# Patient Record
Sex: FEMALE | Race: OTHER | Employment: UNEMPLOYED | URBAN - METROPOLITAN AREA
[De-identification: names, ages, dates, MRNs, and addresses within clinical notes are randomized per-mention and may not be internally consistent; named-entity substitution may affect disease eponyms.]

---

## 2018-07-31 ENCOUNTER — HOSPITAL ENCOUNTER (EMERGENCY)
Age: 58
Discharge: HOME | End: 2018-07-31
Attending: EMERGENCY MEDICINE
Payer: SELF-PAY

## 2018-07-31 VITALS
OXYGEN SATURATION: 97 % | WEIGHT: 180 LBS | BODY MASS INDEX: 35.34 KG/M2 | DIASTOLIC BLOOD PRESSURE: 83 MMHG | TEMPERATURE: 98.4 F | HEIGHT: 60 IN | SYSTOLIC BLOOD PRESSURE: 183 MMHG | RESPIRATION RATE: 18 BRPM | HEART RATE: 60 BPM

## 2018-07-31 DIAGNOSIS — R51.9 ACUTE NONINTRACTABLE HEADACHE, UNSPECIFIED HEADACHE TYPE: Primary | ICD-10-CM

## 2018-07-31 PROCEDURE — 74011250636 HC RX REV CODE- 250/636: Performed by: EMERGENCY MEDICINE

## 2018-07-31 PROCEDURE — 96375 TX/PRO/DX INJ NEW DRUG ADDON: CPT

## 2018-07-31 PROCEDURE — 99281 EMR DPT VST MAYX REQ PHY/QHP: CPT

## 2018-07-31 PROCEDURE — 96374 THER/PROPH/DIAG INJ IV PUSH: CPT

## 2018-07-31 RX ORDER — PROCHLORPERAZINE EDISYLATE 5 MG/ML
10 INJECTION INTRAMUSCULAR; INTRAVENOUS
Status: COMPLETED | OUTPATIENT
Start: 2018-07-31 | End: 2018-07-31

## 2018-07-31 RX ORDER — DIPHENHYDRAMINE HYDROCHLORIDE 50 MG/ML
25 INJECTION, SOLUTION INTRAMUSCULAR; INTRAVENOUS ONCE
Status: COMPLETED | OUTPATIENT
Start: 2018-07-31 | End: 2018-07-31

## 2018-07-31 RX ORDER — SODIUM CHLORIDE 0.9 % (FLUSH) 0.9 %
5-10 SYRINGE (ML) INJECTION AS NEEDED
Status: DISCONTINUED | OUTPATIENT
Start: 2018-07-31 | End: 2018-07-31 | Stop reason: HOSPADM

## 2018-07-31 RX ORDER — SODIUM CHLORIDE 0.9 % (FLUSH) 0.9 %
5-10 SYRINGE (ML) INJECTION EVERY 8 HOURS
Status: DISCONTINUED | OUTPATIENT
Start: 2018-07-31 | End: 2018-07-31 | Stop reason: HOSPADM

## 2018-07-31 RX ORDER — KETOROLAC TROMETHAMINE 30 MG/ML
30 INJECTION, SOLUTION INTRAMUSCULAR; INTRAVENOUS
Status: COMPLETED | OUTPATIENT
Start: 2018-07-31 | End: 2018-07-31

## 2018-07-31 RX ADMIN — KETOROLAC TROMETHAMINE 30 MG: 30 INJECTION, SOLUTION INTRAMUSCULAR at 16:34

## 2018-07-31 RX ADMIN — PROCHLORPERAZINE EDISYLATE 10 MG: 5 INJECTION INTRAMUSCULAR; INTRAVENOUS at 16:34

## 2018-07-31 RX ADMIN — DIPHENHYDRAMINE HYDROCHLORIDE 25 MG: 50 INJECTION, SOLUTION INTRAMUSCULAR; INTRAVENOUS at 16:34

## 2018-07-31 NOTE — ED PROVIDER NOTES
HPI Comments: 59 y.o. female with no significant past medical history who presents from home with chief complaint of headache. Pt's relative reports anterior and superior headache which is often behind the eyes for 1 day accompanied by photophobia. Relative notes pt has hx of headaches for many years, usually once per week. This HA is similar. Pt was seen at [facility] yesterday and was prescribed Fioricet w/ no relief. Pt denies nausea and vomiting. There are no other acute medical concerns at this time. PCP: None Note written by Magi Farris, as dictated by Belkis Quintero MD 3:49 PM 
 
The history is provided by the patient and a relative. A  was used. No past medical history on file. No past surgical history on file. No family history on file. Social History Social History  Marital status: SINGLE Spouse name: N/A  
 Number of children: N/A  
 Years of education: N/A Occupational History  Not on file. Social History Main Topics  Smoking status: Not on file  Smokeless tobacco: Not on file  Alcohol use Not on file  Drug use: Not on file  Sexual activity: Not on file Other Topics Concern  Not on file Social History Narrative ALLERGIES: Review of patient's allergies indicates no known allergies. Review of Systems Eyes: Positive for photophobia. Gastrointestinal: Negative for nausea and vomiting. Neurological: Positive for headaches. All other systems reviewed and are negative. Vitals:  
 07/31/18 1434 BP: 183/83 Pulse: 60 Resp: 18 Temp: 98.4 °F (36.9 °C) SpO2: 97% Weight: 81.6 kg (180 lb) Height: 5' (1.524 m) Physical Exam  
Constitutional: She is oriented to person, place, and time. She appears well-developed and well-nourished. No distress. HENT:  
Head: Normocephalic and atraumatic. Eyes: Conjunctivae are normal. No scleral icterus. Neck: Neck supple.  No tracheal deviation present. Cardiovascular: Normal rate, regular rhythm, normal heart sounds and intact distal pulses. Exam reveals no gallop and no friction rub. No murmur heard. Pulmonary/Chest: Effort normal and breath sounds normal. She has no wheezes. She has no rales. Abdominal: Soft. She exhibits no distension. There is no tenderness. There is no rebound and no guarding. Musculoskeletal: She exhibits no edema. Neurological: She is alert and oriented to person, place, and time. No cranial nerve deficit. Strength and sensation normal.    
Skin: Skin is warm and dry. No rash noted. Psychiatric: She has a normal mood and affect. Nursing note and vitals reviewed. [facility] 
 
 
ED Course Procedures Progress note: she feels moderately better after migraine cocktail. A/P: HA - unclear etiology; it appears she has had similar HA's for years; reassuring appearance and exam; VSS; feels a little better after cocktail; [facility]uro [facility].   Hernando Nugent MD

## 2018-07-31 NOTE — DISCHARGE INSTRUCTIONS
Dolor de caitlin: Instrucciones de cuidado - [ Headache: Care Instructions ]  Instrucciones de cuidado    Los babar de caitlin tienen muchas causas posibles. La mayoría de los babar de caitlin no son señal de un problema más shruthi y mejoran por sí solos. El tratamiento en el hogar podría ayudarlo a sentirse mejor con Tonia Alvarez. El médico lo guevara revisado minuciosamente, darline puede desarrollar problemas más tarde. Si nota algún problema o síntomas, busque tratamiento médico inmediatamente. La atención de seguimiento es tammy parte clave de crouch tratamiento y seguridad. Asegúrese de hacer y acudir a todas las citas, y llame a crouch médico si está teniendo problemas. También es tammy buena idea saber los resultados de anastasiya exámenes y mantener tammy lista de los medicamentos que flakito. ¿Cómo puede cuidarse en el [facility]? · No conduzca si ha tomado analgésicos (medicamentos para el dolor) recetados. · Descanse en un cuarto tranquilo y oscuro hasta que desaparezca el dolor de [place]. Cierre los ojos y trate de relajarse o dormirse. No ann la televisión ni jose. · Colóquese un paño frío y húmedo o [employer] compresa fría sobre la tony adolorida de 10 a 21 minutos cada vez. Póngase un paño galvan entre la compresa fría y la piel. · Utilice tammy toalla húmeda tibia o tammy almohadilla térmica ajustada a baja temperatura para relajar los músculos tensos del izzy y los hombros.  · [place] a alguien que le jody masajes suaves en el izzy y los hombros.  · [place] los analgésicos exactamente belle le fueron indicados. ¨ Si el médico le recetó un analgésico, tómelo según las indicaciones. ¨ Si no está tomando un analgésico recetado, pregúntele a crouch médico si puede anat marge de [employer]. · Tenga cuidado de no anat analgésicos con mayor frecuencia que la permitida en las indicaciones porque los babar de caitlin podrían empeorar o aparecer con mayor frecuencia tammy vez que el medicamento pierda crouch [place].   · Preste atención a los nuevos síntomas que aparecen con el dolor de Faye Phelps, [place], debilidad o entumecimiento, cambios en la visión o confusión. Podrían ser señales de un problema más grave. Para prevenir los babar de caitlin  · [employer] un diario de anastasiya babar de caitlin para que pueda averiguar qué los desencadena. Evitar los desencadenantes podría ayudar a prevenir los babar de Faye Phelps. Anote cuándo empieza cada dolor de Faye Phelps, cuánto dura y cómo es el dolor (palpitante, vanesa, punzante o sordo). Anote cualquier otro síntoma que haya tenido con el dolor de Faye Phelps, [place] náuseas, destellos de lisa o OLGA, o sensibilidad a la lisa brillante o a los ruidos juan. Anote si el dolor de caitlin ocurrió cerca de crouch menstruación. Enumere todos los factores que pudieran saturnino desencadenado el dolor de Faye Phelps, belle ciertos alimentos (chocolate, queso, vino) u olores, humo, luces brillantes, estrés o falta de sueño. · Encuentre maneras saludables de [facility]. Los babar de Faye Phelps son más comunes linda o kd después de un momento estresante. Tómese un tiempo para relajarse antes y después de hacer algo que le haya causado un dolor de caitlin en el pasado. · Trate de mantener anastasiya músculos relajados mediante tammy buena postura. Revise si tiene [employer] de la Yu, la yohana, el izzy y los hombros y trate de relajarlos. Cuando se siente en un escritorio, cambie de posición con frecuencia y estírese por 27 segundos cada hora. · Jeff suficiente ejercicio y duerma bastante. · Coma en forma regular y raúl. Largos períodos sin comer pueden provocar un dolor de caitlin. · Regálese un masaje. Algunas personas encuentran que los masajes hechos con regularidad son [employer] Capes para aliviar la tensión. · Limite la cafeína. No los demasiado café, té ni sodas. Yeyo no deje de consumir cafeína de repente, porque eso también puede provocarle babar de Faye Phelps.   · Reduzca la tensión en los ojos a causa de la computadora parpadeando con frecuencia y apartando la mirada de la pantalla a menudo. Asegúrese de tener lentes adecuados y de que crouch monitor esté colocado de manera correcta, belle a un brazo de distancia. · Busque ayuda si tiene depresión o ansiedad. Miracle babar de [place] podrían relacionarse con estas afecciones. El tratamiento puede prevenir los babar de [place] y ayudar con los síntomas de ansiedad o depresión. ¿Cuándo debe pedir ayuda? Llame al 911 en cualquier momento que piense que puede necesitar atención de emergencia. Por ejemplo, llame si:    · Tiene señales de un ataque cerebral. Estas pueden incluir:  ¨ Parálisis, entumecimiento o debilidad repentinos en la yohana, el brazo o la pierna, sobre todo si ocurre en un solo lado del cuerpo. ¨ Cambios repentinos en la visión. ¨ Dificultades repentinas para hablar. ¨ Confusión repentina o dificultad para comprender frases sencillas. ¨ Problemas repentinos para caminar o mantener el equilibrio. ¨ Dolor de [place] intenso y repentino, distinto de los babar de caitlin anteriores.    Llame a crouch médico ahora mismo o busque atención médica inmediata si:    · Tiene un dolor de Cristine Severs o peor.     · Crouch dolor de caitlin empeora mucho. ¿Dónde puede encontrar más información en inglés? Elaine Henderson a http://drake-biju.info/. Escriba X762 en la búsqueda para aprender más acerca de \"Dolor de caitlin: Instrucciones de cuidado - [ Headache: Care Instructions ]. \"  Revisado: 9 octubre, 2017  Versión del contenido: 11.7  © 5987-0006 [employer]. Las instrucciones de cuidado fueron adaptadas bajo licencia por [employer] (which disclaims liability or warranty for this information). Si usted tiene [place] afección médica o sobre estas instrucciones, siempre pregunte a crouch profesional de alysha. [employer] niega toda garantía o responsabilidad por crouch uso de esta información.

## 2018-07-31 NOTE — ED TRIAGE NOTES
Pt arrives with c/o of a headache that has been going on since yesterday, pt is sensitive to light as well. Pt denies any feelings of n/v. [facility] and was given medicine, pt states it is not helping.

## 2019-01-03 PROCEDURE — 99283 EMERGENCY DEPT VISIT LOW MDM: CPT

## 2019-01-03 PROCEDURE — 96374 THER/PROPH/DIAG INJ IV PUSH: CPT

## 2019-01-03 PROCEDURE — 96375 TX/PRO/DX INJ NEW DRUG ADDON: CPT

## 2019-01-04 ENCOUNTER — APPOINTMENT (OUTPATIENT)
Dept: GENERAL RADIOLOGY | Age: 59
End: 2019-01-04
Attending: PHYSICIAN ASSISTANT
Payer: SELF-PAY

## 2019-01-04 ENCOUNTER — HOSPITAL ENCOUNTER (EMERGENCY)
Age: 59
Discharge: HOME | End: 2019-01-04
Attending: EMERGENCY MEDICINE
Payer: SELF-PAY

## 2019-01-04 VITALS
TEMPERATURE: 98.2 F | SYSTOLIC BLOOD PRESSURE: 132 MMHG | HEIGHT: 59 IN | RESPIRATION RATE: 16 BRPM | WEIGHT: 170 LBS | OXYGEN SATURATION: 94 % | BODY MASS INDEX: 34.27 KG/M2 | HEART RATE: 69 BPM | DIASTOLIC BLOOD PRESSURE: 66 MMHG

## 2019-01-04 DIAGNOSIS — R51.9 NONINTRACTABLE EPISODIC HEADACHE, UNSPECIFIED HEADACHE TYPE: Primary | ICD-10-CM

## 2019-01-04 DIAGNOSIS — M54.32 SCIATICA, LEFT SIDE: ICD-10-CM

## 2019-01-04 DIAGNOSIS — M51.36 DDD (DEGENERATIVE DISC DISEASE), LUMBAR: ICD-10-CM

## 2019-01-04 LAB
APPEARANCE UR: CLEAR
BACTERIA URNS QL MICRO: NEGATIVE /HPF
BILIRUB UR QL: NEGATIVE
CAOX CRY URNS QL MICRO: ABNORMAL
COLOR UR: ABNORMAL
COMMENT, HOLDF: NORMAL
EPITH CASTS URNS QL MICRO: ABNORMAL /LPF
GLUCOSE BLD STRIP.AUTO-MCNC: 166 MG/DL (ref 65–100)
GLUCOSE UR STRIP.AUTO-MCNC: >1000 MG/DL
HGB UR QL STRIP: ABNORMAL
KETONES UR QL STRIP.AUTO: NEGATIVE MG/DL
LEUKOCYTE ESTERASE UR QL STRIP.AUTO: ABNORMAL
NITRITE UR QL STRIP.AUTO: NEGATIVE
PH UR STRIP: 5.5 [PH] (ref 5–8)
PROT UR STRIP-MCNC: NEGATIVE MG/DL
RBC #/AREA URNS HPF: ABNORMAL /HPF (ref 0–5)
SAMPLES BEING HELD,HOLD: NORMAL
SERVICE CMNT-IMP: ABNORMAL
SP GR UR REFRACTOMETRY: 1.02 (ref 1–1.03)
UR CULT HOLD, URHOLD: NORMAL
UROBILINOGEN UR QL STRIP.AUTO: 0.2 EU/DL (ref 0.2–1)
WBC URNS QL MICRO: ABNORMAL /HPF (ref 0–4)

## 2019-01-04 PROCEDURE — 74011250636 HC RX REV CODE- 250/636: Performed by: PHYSICIAN ASSISTANT

## 2019-01-04 PROCEDURE — 87147 CULTURE TYPE IMMUNOLOGIC: CPT

## 2019-01-04 PROCEDURE — 81001 URINALYSIS AUTO W/SCOPE: CPT

## 2019-01-04 PROCEDURE — 87086 URINE CULTURE/COLONY COUNT: CPT

## 2019-01-04 PROCEDURE — 82962 GLUCOSE BLOOD TEST: CPT

## 2019-01-04 PROCEDURE — 72100 X-RAY EXAM L-S SPINE 2/3 VWS: CPT

## 2019-01-04 PROCEDURE — 73502 X-RAY EXAM HIP UNI 2-3 VIEWS: CPT

## 2019-01-04 RX ORDER — IBUPROFEN 400 MG/1
400 TABLET ORAL
Qty: 20 TAB | Refills: 0 | Status: SHIPPED | OUTPATIENT
Start: 2019-01-04

## 2019-01-04 RX ORDER — PROCHLORPERAZINE EDISYLATE 5 MG/ML
10 INJECTION INTRAMUSCULAR; INTRAVENOUS
Status: COMPLETED | OUTPATIENT
Start: 2019-01-04 | End: 2019-01-04

## 2019-01-04 RX ORDER — KETOROLAC TROMETHAMINE 30 MG/ML
15 INJECTION, SOLUTION INTRAMUSCULAR; INTRAVENOUS
Status: COMPLETED | OUTPATIENT
Start: 2019-01-04 | End: 2019-01-04

## 2019-01-04 RX ORDER — KETOROLAC TROMETHAMINE 30 MG/ML
INJECTION, SOLUTION INTRAMUSCULAR; INTRAVENOUS
Status: DISCONTINUED
Start: 2019-01-04 | End: 2019-01-04 | Stop reason: HOSPADM

## 2019-01-04 RX ORDER — PROCHLORPERAZINE EDISYLATE 5 MG/ML
INJECTION INTRAMUSCULAR; INTRAVENOUS
Status: DISCONTINUED
Start: 2019-01-04 | End: 2019-01-04 | Stop reason: HOSPADM

## 2019-01-04 RX ORDER — BUTALBITAL, ACETAMINOPHEN AND CAFFEINE 300; 40; 50 MG/1; MG/1; MG/1
1-2 CAPSULE ORAL
Qty: 15 CAP | Refills: 0 | Status: SHIPPED | OUTPATIENT
Start: 2019-01-04

## 2019-01-04 RX ORDER — DIPHENHYDRAMINE HYDROCHLORIDE 50 MG/ML
INJECTION, SOLUTION INTRAMUSCULAR; INTRAVENOUS
Status: DISCONTINUED
Start: 2019-01-04 | End: 2019-01-04 | Stop reason: HOSPADM

## 2019-01-04 RX ORDER — DIPHENHYDRAMINE HYDROCHLORIDE 50 MG/ML
25 INJECTION, SOLUTION INTRAMUSCULAR; INTRAVENOUS
Status: COMPLETED | OUTPATIENT
Start: 2019-01-04 | End: 2019-01-04

## 2019-01-04 RX ADMIN — DIPHENHYDRAMINE HYDROCHLORIDE 25 MG: 50 INJECTION, SOLUTION INTRAMUSCULAR; INTRAVENOUS at 01:23

## 2019-01-04 RX ADMIN — KETOROLAC TROMETHAMINE 15 MG: 30 INJECTION, SOLUTION INTRAMUSCULAR at 01:24

## 2019-01-04 RX ADMIN — PROCHLORPERAZINE EDISYLATE 10 MG: 5 INJECTION INTRAMUSCULAR; INTRAVENOUS at 01:25

## 2019-01-04 NOTE — ED PROVIDER NOTES
Alpa Andre is a 59 y.o. female with PMH of headache presents to emergency room ambulatory with son who translates at patient's request, for evaluation of headache which came on at 2pm, slow onset with mild light sensitivity. Also c/o intermittent tingling from L buttock, L hip to left knee which began 2 weeks ago. No recent fall, F/C, neck stiffness/pain, N/V/D, CP, SOB, rash, tinnitus, dizziness, syncope / near syncope, urinary sx's. No change in gait or leg swelling. States LYNCH feels similar to her previous HA when she was seen in July 2018 at [facility] and given Benadryl, compazine, toradol with improvement of her symptoms. No bowel/bladder dysfunction, no saddle anesthesia, no extremity weakness. PCP: None Surgical hx- see chart Social hx- no tobacco 
 
The patient has no other complaints at this time. History reviewed. No pertinent past medical history. History reviewed. No pertinent surgical history. History reviewed. No pertinent family history. Social History Socioeconomic History  Marital status: SINGLE Spouse name: Not on file  Number of children: Not on file  Years of education: Not on file  Highest education level: Not on file Social Needs  Financial resource strain: Not on file  Food insecurity - worry: Not on file  Food insecurity - inability: Not on file  Transportation needs - medical: Not on file  Transportation needs - non-medical: Not on file Occupational History  Not on file Tobacco Use  Smoking status: Never Smoker  Smokeless tobacco: Never Used Substance and Sexual Activity  Alcohol use: No  
  Frequency: Never  Drug use: No  
 Sexual activity: Not on file Other Topics Concern  Not on file Social History Narrative  Not on file ALLERGIES: Patient has no known allergies. Review of Systems Constitutional: Negative.   Negative for activity change, chills, fatigue and unexpected weight change. HENT: Negative for trouble swallowing. Respiratory: Negative for cough, chest tightness, shortness of breath and wheezing. Cardiovascular: Negative. Negative for chest pain and palpitations. Gastrointestinal: Negative. Negative for abdominal pain, diarrhea, nausea and vomiting. Genitourinary: Negative. Negative for dysuria, flank pain, frequency and hematuria. Musculoskeletal: Positive for back pain (L lower). Negative for arthralgias, neck pain and neck stiffness. Skin: Negative. Negative for color change and rash. Neurological: Positive for headaches. Negative for dizziness and numbness. All other systems reviewed and are negative. Vitals:  
 01/03/19 2338 BP: 188/90 Pulse: 74 Resp: 18 Temp: 98.2 °F (36.8 °C) SpO2: 97% Weight: 77.1 kg (170 lb) Height: 4' 10.66\" (1.49 m) Physical Exam  
Constitutional: She is oriented to person, place, and time. She appears well-developed and well-nourished. She is active. Non-toxic appearance. No distress. HENT:  
Head: Normocephalic and atraumatic. Eyes: Conjunctivae are normal. Pupils are equal, round, and reactive to light. Right eye exhibits no discharge. Left eye exhibits no discharge. Neck: Normal range of motion and full passive range of motion without pain. No tracheal tenderness present. No meningeal signs Cardiovascular: Normal rate, regular rhythm, normal heart sounds, intact distal pulses and normal pulses. Exam reveals no gallop and no friction rub. No murmur heard. Pulmonary/Chest: Effort normal and breath sounds normal. No respiratory distress. She has no wheezes. She has no rales. She exhibits no tenderness. Abdominal: Soft. Bowel sounds are normal. She exhibits no distension. There is no tenderness. There is no rebound and no guarding. Musculoskeletal: Normal range of motion. She exhibits no edema or tenderness. Neurological: She is alert and oriented to person, place, and time. She has normal strength. No cranial nerve deficit or sensory deficit. Coordination normal.  
Strength 5/5 in upper and lower extremities. NVI throughout. Skin: Skin is warm, dry and intact. Capillary refill takes less than 2 seconds. No abrasion and no rash noted. She is not diaphoretic. No erythema. Psychiatric: She has a normal mood and affect. Her speech is normal and behavior is normal. Cognition and memory are normal.  
Nursing note and vitals reviewed. MDM Number of Diagnoses or Management Options Diagnosis management comments:  
Ddx: headache, migraine ; UTI, sciatica, frx, DJD Amount and/or Complexity of Data Reviewed Clinical lab tests: ordered and reviewed Tests in the radiology section of CPT®: ordered and reviewed Review and summarize past medical records: yes Discuss the patient with other providers: yes Patient Progress Patient progress: stable Procedures I discussed patient's PMH, exam findings as well as careplan with the ER attending who agrees with care plan. Laci Gann PA-C 
 
 
 
2:46 AM 
Patient re-assessed, HA improved, only minimal now. L buttock pain resolved. Discussed XR and lab findings. No sx's of cauda equina. Laci Gann PA-C 
 
LABORATORY TESTS: 
Recent Results (from the past 12 hour(s)) SAMPLES BEING HELD Collection Time: 01/04/19  1:05 AM  
Result Value Ref Range SAMPLES BEING HELD SST,RD,BUTCH,LAV,PST COMMENT Add-on orders for these samples will be processed based on acceptable specimen integrity and analyte stability, which may vary by analyte. URINALYSIS W/MICROSCOPIC Collection Time: 01/04/19  1:14 AM  
Result Value Ref Range Color YELLOW/STRAW Appearance CLEAR CLEAR Specific gravity 1.020 1.003 - 1.030    
 pH (UA) 5.5 5.0 - 8.0 Protein NEGATIVE  NEG mg/dL Glucose >1,000 (A) NEG mg/dL Ketone NEGATIVE  NEG mg/dL Bilirubin NEGATIVE  NEG Blood TRACE (A) NEG Urobilinogen 0.2 0.2 - 1.0 EU/dL Nitrites NEGATIVE  NEG Leukocyte Esterase TRACE (A) NEG    
 WBC 10-20 0 - 4 /hpf  
 RBC 0-5 0 - 5 /hpf Epithelial cells FEW FEW /lpf Bacteria NEGATIVE  NEG /hpf  
 CA Oxalate crystals 1+ (A) NEG URINE CULTURE HOLD SAMPLE Collection Time: 01/04/19  1:14 AM  
Result Value Ref Range Urine culture hold URINE ON HOLD IN MICROBIOLOGY DEPT FOR 3 DAYS. IF UNPRESERVED URINE IS SUBMITTED, IT CANNOT BE USED FOR ADDITIONAL TESTING AFTER 24 HRS, RECOLLECTION WILL BE REQUIRED. GLUCOSE, POC Collection Time: 01/04/19  2:37 AM  
Result Value Ref Range Glucose (POC) 166 (H) 65 - 100 mg/dL Performed by [facility] RESULTS: 
 
MEDICATIONS GIVEN: 
Medications diphenhydrAMINE (BENADRYL) 50 mg/mL injection (not administered) prochlorperazine (COMPAZINE) 5 mg/mL injection (not administered)  
ketorolac (TORADOL) 30 mg/mL (1 mL) injection (not administered)  
ketorolac (TORADOL) injection 15 mg (15 mg IntraVENous Given 1/4/19 0124) prochlorperazine (COMPAZINE) injection 10 mg (10 mg IntraVENous Given 1/4/19 0125) diphenhydrAMINE (BENADRYL) injection 25 mg (25 mg IntraVENous Given 1/4/19 0123) DISCHARGE NOTE: 
2:46 AM 
The patient's results have been reviewed with them and/or available family. Patient and/or family verbally conveyed their understanding and agreement of the patient's signs, symptoms, diagnosis, treatment and prognosis and additionally agree to follow up as recommended in the discharge instructions or to return to the Emergency Room should their condition change prior to their follow-up appointment. The patient/family verbally agrees with the care-plan and verbally conveys that all of their questions have been answered.  The discharge instructions have also been provided to the patient and/or family with some educational information regarding the patient's diagnosis as well a list of reasons why the patient would want to return to the ER prior to their follow-up appointment, should their condition change. Plan: 
1. F/U with PCP or free clinic 2. Rx ibuprofen; [place] 3. Return precautions discussed and advised to return to ER if worse

## 2019-01-04 NOTE — DISCHARGE INSTRUCTIONS
Patient Education        Dolor de espalda: Instrucciones de cuidado - [ Back Pain: Care Instructions ]  Instrucciones de cuidado    El dolor de espalda tiene muchas causas posibles. Con frecuencia, está relacionado con problemas en los músculos y ligamentos de la espalda. También podría estar relacionado con problemas de los nervios, discos o huesos de la espalda. Moverse, levantar algo, ponerse de pie, sentarse o dormir de [employer] incómoda pueden forzar la espalda. Algunas veces no se da cuenta de que tiene tammy lesión [employer] tarde. La artritis es otra causa común del dolor de espalda. Aunque sabrina vez duela mucho, el dolor de espalda por lo general mejora por sí mismo en varias semanas. [address] personas se recuperan en 12 semanas o menos. Hacer un buen tratamiento en el hogar y tener cuidado de no forzar la espalda puede ayudar a que se sienta mejor más pronto. La atención de seguimiento es tammy parte clave de crouch tratamiento y seguridad. Asegúrese de hacer y acudir a todas las citas, y llame a crouch médico si está teniendo problemas. También es tammy buena idea saber los resultados de anastasiya exámenes y mantener tammy lista de los medicamentos que flakito. ¿Cómo puede cuidarse en el hogar? · Siéntese o acuéstese en las posiciones más cómodas y que reduzcan el dolor. Trate tammy de estas posiciones al Jody Marcum:  ? Acuéstese boca arriba con las rodillas dobladas y apoyadas sobre [address]. ? Acuéstese en el piso con las piernas sobre el asiento de un sofá o tammy silla. ? Acuéstese de lado con las rodillas y caderas dobladas y [place] almohada entre las piernas. ? Acuéstese boca abajo siempre que esta posición no empeore el dolor. · No se siente en la cama y evite los sofás blandos y las posiciones torcidas. El reposo en cama puede aliviar el dolor al principio, darline retrasa la sanación. Evite reposar en la cama después del primer día de dolor de espalda. · Cambie de posición cada 30 minutos.  Si debe sentarse por [employer], párese y descanse de estar sentado. Levántese y camine, o acuéstese en tammy posición cómoda. · Pruebe usar tammy almohadilla térmica a temperatura baja o mediana linda 15 a 20 minutos cada 2 ó 3 horas. Pruebe tammy ducha tibia en vez de tammy sesión con la almohadilla térmica. · También puede probar tamym compresa de hielo linda 10 a 15 minutos cada 2 a 3 horas. Póngase un paño galvan entre la compresa de hielo y la piel. · [employer] analgésicos (medicamentos para el dolor) exactamente según las indicaciones. ? Si el médico le recetó un analgésico, tómelo según las indicaciones. ? Si no está tomando un analgésico recetado, pregúntele a crouch médico si puede anat marge de [employer]. · Jody caminatas cortas varias veces al día. Usted puede comenzar con 5 a 10 minutos, 3 ó 4 veces al día, y aumentar progresivamente hasta lograr caminatas más largas. Camine en superficies suellen y evite colinas y escaleras hasta que la espalda esté mejor. · Regrese al Yamel [place] y otras actividades lo más pronto posible. El descanso continuo sin actividad por lo general no es lema para la espalda. · Para prevenir el dolor de espalda en el futuro, jody ejercicios para estirar y fortalecer la espalda y el abdomen. Aprenda a [place], técnicas seguras para levantar peso y la mecánica corporal apropiada. ¿Cuándo debe pedir ayuda? Llame a crouch médico ahora mismo o busque atención médica inmediata si:    · Tiene un entumecimiento nuevo o peor en las piernas.     · Tiene debilidad nueva o peor en las piernas. ([place] puede hacer que le resulte difícil ponerse de pie).   · Pierde el control de la vejiga o los intestinos.    Preste especial atención a los cambios en crouch alysha y asegúrese de comunicarse con crouch médico si:    · Tiene fiebre, pierde peso o no se siente raúl.     · No mejora belle se esperaba. ¿Dónde puede encontrar más información en inglés?   Lisa Aleshia a http://[email or web address].info/. Nelda P291 en la búsqueda para aprender [place] de \"Dolor de espalda: Instrucciones de cuidado - [ Back Pain: Care Instructions ]. \"  Revisado: 29 noviembre, 2017  Versión del contenido: 11.8  © 3664-3765 Healthwise, Incorporated. Las instrucciones de cuidado fueron adaptadas bajo licencia por Good Help Connections (which disclaims liability or warranty for this information). Si usted tiene [place] afección médica o sobre estas instrucciones, siempre pregunte a crouch profesional de alysha. Healthwise, Incorporated niega toda garantía o responsabilidad por crouch uso de esta información. Patient Education        Dolor de Haley Amabile: Instrucciones de cuidado - [ Headache: Care Instructions ]  Instrucciones de cuidado    Los babar de caitlin tienen muchas causas posibles. La mayoría de los babar de caitlin no son señal de un problema más shruthi y mejoran por sí solos. El tratamiento en el hogar podría ayudarlo a sentirse mejor con [place] Dia. El médico lo guevara revisado minuciosamente, darline puede desarrollar problemas más tarde. Si nota algún problema o síntomas, busque tratamiento médico inmediatamente. La atención de seguimiento es tammy parte clave de crouch tratamiento y seguridad. Asegúrese de hacer y acudir a todas las citas, y llame a crouch médico si está teniendo problemas. También es tammy buena idea saber los resultados de anastasiya exámenes y mantener tammy lista de los medicamentos que flakito. ¿Cómo puede cuidarse en el hogar? · No conduzca si ha tomado analgésicos (medicamentos para el dolor) recetados. · Descanse en un cuarto tranquilo y oscuro hasta que desaparezca el dolor de Haley Amabile. Cierre los ojos y trate de relajarse o dormirse. No ann la televisión ni jose. · Colóquese un paño frío y húmedo o [place] compresa fría sobre la tony adolorida de 10 a 21 minutos cada vez. Póngase un paño galvan entre la compresa fría y la piel.   · Utilice tammy toalla húmeda tibia o tammy almohadilla térmica ajustada a baja temperatura para relajar los músculos tensos del izzy y los hombros.  · Pídale a alguien que le jody masajes suaves en el izzy y los hombros.  · [place] los analgésicos exactamente belle le fueron indicados. ? Si el médico le recetó un analgésico, tómelo según las indicaciones. ? Si no está tomando un analgésico recetado, pregúntele a crouch médico si puede anat marge de [employer]. · Tenga cuidado de no anat analgésicos con mayor frecuencia que la permitida en las indicaciones porque los babar de caitlin podrían empeorar o aparecer con mayor frecuencia tammy vez que el medicamento pierda crouch [place]. · Preste atención a los nuevos síntomas que aparecen con el dolor de [place], [place], debilidad o entumecimiento, cambios en la visión o confusión. Podrían ser señales de un problema más grave. Para prevenir los babar de caitlin  · [employer] un diario de anastasiya babar de caitlin para que pueda averiguar qué los desencadena. Evitar los desencadenantes podría ayudar a prevenir los babar de [place]. Anote cuándo empieza cada dolor de [place], cuánto dura y cómo es el dolor (palpitante, vanesa, punzante o sordo). Anote cualquier otro síntoma que haya tenido con el dolor de [place], [place] náuseas, destellos de lisa o OLGA, o sensibilidad a la lisa brillante o a los ruidos juan. Anote si el dolor de caitlin ocurrió cerca de crouch menstruación. Enumere todos los factores que pudieran saturnino desencadenado el dolor de [place], belle ciertos alimentos (chocolate, queso, vino) u olores, humo, luces brillantes, estrés o falta de sueño. · Encuentre maneras saludables de [employer]. Los babar de [place] son más comunes linda o kd después de un momento estresante. Tómese un tiempo para relajarse antes y después de hacer algo que le haya causado un dolor de caitlin en el pasado. · Trate de mantener anastasiya músculos relajados mediante tammy buena postura.  Revise si tiene [place]ana ''[place] músculos de la mandíbula, la yohana, el izzy y los hombros y trate de relajarlos. Cuando se siente en un escritorio, cambie de posición con frecuencia y estírese por 27 segundos cada hora. · Ejff suficiente ejercicio y duerma bastante. · Coma en forma regular y raúl. Largos períodos sin comer pueden provocar un dolor de caitlin. · Regálese un masaje. Algunas personas encuentran que los masajes hechos con regularidad son Duanne Ramon para aliviar la tensión. · Limite la cafeína. No los demasiado café, té ni sodas. Yeyo no deje de consumir cafeína de repente, porque eso también puede provocarle babar de [place]. · Reduzca la tensión en los ojos a causa de la computadora parpadeando con frecuencia y apartando la mirada de la pantalla a menudo. Asegúrese de tener lentes adecuados y de que crouch monitor esté colocado de manera correcta, belle a un brazo de distancia. · Busque ayuda si tiene depresión o ansiedad. Miracle babar de [place] podrían relacionarse con estas afecciones. El tratamiento puede prevenir los babar de [place] y ayudar con los síntomas de ansiedad o depresión. ¿Cuándo debe pedir ayuda? Llame al 911 en cualquier momento que piense que puede necesitar atención de emergencia. Por ejemplo, llame si:    · Tiene señales de un ataque cerebral. Estas pueden incluir:  ? Parálisis, entumecimiento o debilidad repentinos en la yohana, el brazo o la pierna, sobre todo si ocurre en un solo lado del cuerpo. ? Cambios repentinos en la visión. ? Dificultades repentinas para hablar. ? Confusión repentina o dificultad para comprender frases sencillas. ? Problemas repentinos para caminar o mantener el equilibrio. ? Dolor de [place] intenso y repentino, distinto de los babar de caitlin anteriores.    Llame a crouch médico ahora mismo o busque atención médica inmediata si:    · Tiene un dolor de Elizabeth Carbine o peor.     · Crouch dolor de caitlin empeora mucho. ¿Dónde puede encontrar más información en inglés?   Aaron Graves a http://drake-biju.info/. Escriba I044 en la búsqueda para aprender más acerca de \"Dolor de caitlin: Instrucciones de cuidado - [ Headache: Care Instructions ]. \"  Revisado: 4 junio, 2018  Versión del contenido: 11.8  © 3920-3384 Healthwise, Incorporated. Las instrucciones de cuidado fueron adaptadas bajo licencia por Good Help Connections (which disclaims liability or warranty for this information). Si usted tiene [place] afección médica o sobre estas instrucciones, siempre pregunte a jhaveri profesional de alysha. Healthwise, Incorporated niega toda garantía o responsabilidad por jhaveri uso de esta información. Patient Education        Ciática: Instrucciones de cuidado - [ Sciatica: Care Instructions ]  Instrucciones de cuidado    La ciática es tammy irritación de marge de los nervios ciáticos, que salen de la médula carreon en la parte baja de la espalda. Los nervios ciáticos y anastasiya ramificaciones se extienden hacia abajo por la nalga hasta el pie. La ciática puede desarrollarse cuando un disco lesionado en la espalda ejerce presión contra la raíz de un nervio carreon. Jhaveri síntoma principal es dolor, entumecimiento o debilidad que con frecuencia es peor en la pierna o el pie que en la espalda. A menudo la ciática mejora y desaparece con el paso del Michael. Un tratamiento temprano generalmente incluye medicamentos y ejercicios para aliviar el dolor. La atención de seguimiento es tammy parte clave de jhaveri tratamiento y seguridad. Asegúrese de hacer y acudir a todas las citas, y llame a jhaveri médico si está teniendo problemas. También es tammy buena idea saber los resultados de anastasiya exámenes y mantener tammy lista de los medicamentos que flakito. ¿Cómo puede cuidarse en el hogar? · [employer] analgésicos (medicamentos para el dolor) exactamente belle le fueron indicados. ? Si el médico le recetó un analgésico, tómelo según las indicaciones.   ? Si no está tomando un analgésico recetado, pregúntele a jhaveri médico si puede anat marge de [employer]. · Utilice calor o frío para aliviar el dolor. ? Para aplicar calor, póngase tammy bolsa de agua tibia, tammy almohadilla térmica a baja temperatura o tammy compresa tibia [employer]. No se vaya a dormir con tammy almohadilla térmica sobre la piel. ? Para utilizar frío, póngase hielo o tammy compresa fría sobre la tony linda un período de 10 a 20 minutos cada vez. Póngase un paño galvan entre el hielo y la piel. · Evite sentarse si es posible, a menos que se sienta mejor que de pie. · Alterne entre recostarse y jesse caminatas cortas. Aumente la distancia que camina tanto belle pueda sin empeorar los síntomas. · No jody nada que empeore los síntomas. ¿Cuándo debe pedir ayuda? Llame al 911 en cualquier momento que considere que necesita atención de [place]. Por ejemplo, llame si:    · Es completamente incapaz de  tammy pierna.    Llame a crouch médico ahora mismo o busque atención médica inmediata si:    · Tiene síntomas nuevos o peores en [place] [address] (glúteos). Los síntomas pueden incluir:  ? Entumecimiento u hormigueo. ? Debilidad. ? Dolor.     · Pierde el control de la vejiga o del intestino.    Vigile de cerca los cambios en crouch alysha y asegúrese de comunicarse con crouch médico si:    · No mejora belle se esperaba. ¿Dónde puede encontrar más información en inglés? Ana Marsh a http://drake-biju.info/. Mechelle Whitehead C564 en la búsqueda para aprender más acerca de \"Ciática: Instrucciones de cuidado - [ Sciatica: Care Instructions ]. \"  Revisado: 29 noviembre, 2017  Versión del contenido: 11.8  © 8488-7776 Healthwise, [employer]. Las instrucciones de cuidado fueron adaptadas bajo licencia por Good Help Connections (which disclaims liability or warranty for this information). Si usted tiene [place] afección médica o sobre estas instrucciones, siempre pregunte a crouch profesional de alysha.  [employer] niega toda garantía o responsabilidad por crouch uso de esta información. Patient Education        Ciática: Ejercicios - [ Sciatica: Exercises ]  Instrucciones de cuidado  Éstos son algunos ejemplos de ejercicios típicos de rehabilitación para crouch afección. Comience cada ejercicio lentamente. Reduzca la intensidad del ejercicio si Bobby Stands a sentir dolor. Crouch médico o el fisioterapeuta le dirán cuándo puede comenzar con estos ejercicios y cuáles funcionarán mejor para usted. Cuando no esté activo, encuentre tammy posición cómoda para descansar. Algunas personas se sienten cómodas en el piso o en tammy cama de firmeza media con tammy almohada pequeña debajo de la caitlin y otra debajo de anastasiya rodillas. Otras personas prefieren acostarse de lado con tammy almohada entre las rodillas. No permanezca en tammy posición por [facility]. Dé paseos cortos (10 a 20 minutos) cada 2 a 3 horas. Evite las pendientes, las colinas y las escaleras hasta que se sienta mejor. Sólo camine distancias que pueda manejar sin dolor, especialmente dolor en las piernas. Cómo se hacen los ejercicios  Estiramientos de la espalda    1. [facility] y las rodillas en el piso. 2. Relaje la caitlin y 200 [facility]. Arquee la espalda hacia el techo, hasta que sienta que las partes kenzie, media y baja se estiran agradablemente. Mantenga yeni estiramiento linda el tiempo que se sienta cómodo, o de 15 a 30 segundos. 3. Vuelva a la posición inicial con la espalda plana mientras está apoyado de vidhi y rodillas. 4. Deje que crouch espalda se nivele empujando el [employer] [address] (glúteos) hacia el techo. 5. Mantenga esta posición linad 15 a 30 segundos. 6. Repita de 2 a 4 veces. La atención de seguimiento es tammy parte clave de crouch tratamiento y seguridad. Asegúrese de hacer y acudir a todas las citas, y llame a crouch médico si está teniendo problemas.  También es tammy buena idea saber los resultados de anastasiya exámenes y mantener tammy lista de [place] medicamentos que flakito. ¿Dónde puede encontrar más información en inglés? Minor Cordia a [email or web address]-biju.info/. Fab Valdez N482 en la búsqueda para aprender más acerca de \"Ciática: Ejercicios - [ Sciatica: Exercises ]. \"  Revisado: 29 noviembre, 2017  Versión del contenido: 11.8  © 2241-6458 Healthwise, Incorporated. Las instrucciones de cuidado fueron adaptadas bajo licencia por Good Help Connections (which disclaims liability or warranty for this information). Si usted tiene [place] afección médica o sobre estas instrucciones, siempre pregunte a crouch profesional de alysha. Healthwise, Incorporated niega toda garantía o responsabilidad por crouch uso de esta información. [facility] Departments     For adult and child immunizations, family planning, TB screening, STD testing and women's health services. [facility]: [place] [phone]      [facility] 25   657 [facility]   [address]   170 [facility]: [facility] 200 [facility] [phone]      [address] [place] [address]          [address] [place] [address]     For primary care services, woman and child wellness, and some clinics providing specialty care. [facility] -- [facility]. [address] [place] [address] [phone]   [address] [place] [address] [facility] 200 [facility] [address] [place] [address] [phone]   [address] [place] [address] [address] [place] [address] [phone] 11878 [facility] [address] [place] [address]  [phone]   [address] [place] [address] [phone]   [address] [phone]   [facility] 1051 [facility] [phone]   [facility]: [address] [phone], ext [facility] 79 [facility] [phone] Grant [facility] 59 [phone]   [place] 5850 [facility]  [phone]   [facility]  [address] [place]  ([email or web address]) [phone]

## 2019-01-04 NOTE — ED TRIAGE NOTES
Triage: Reports having a headache since 2 pm.  Denies vomiting. Also reports left hip pain starting at the sciatic nerve and traveling down the left leg. Denies injury or falling ambulatory to triage.

## 2019-01-05 LAB
BACTERIA SPEC CULT: ABNORMAL
BACTERIA SPEC CULT: ABNORMAL
CC UR VC: ABNORMAL
SERVICE CMNT-IMP: ABNORMAL

## 2021-05-07 ENCOUNTER — IMMUNIZATION (OUTPATIENT)
Dept: FAMILY MEDICINE CLINIC | Age: 61
End: 2021-05-07

## 2021-05-07 DIAGNOSIS — Z23 ENCOUNTER FOR IMMUNIZATION: Primary | ICD-10-CM

## 2021-05-07 PROCEDURE — 91301 COVID-19, MRNA, LNP-S, PF, 100MCG/0.5ML DOSE(MODERNA): CPT

## 2021-05-07 PROCEDURE — 0011A COVID-19, MRNA, LNP-S, PF, 100MCG/0.5ML DOSE(MODERNA): CPT

## 2021-06-04 ENCOUNTER — IMMUNIZATION (OUTPATIENT)
Dept: FAMILY MEDICINE CLINIC | Age: 61
End: 2021-06-04

## 2021-06-04 DIAGNOSIS — Z23 ENCOUNTER FOR IMMUNIZATION: Primary | ICD-10-CM

## 2021-06-04 PROCEDURE — 91301 COVID-19, MRNA, LNP-S, PF, 100MCG/0.5ML DOSE(MODERNA): CPT

## 2021-06-04 PROCEDURE — 0012A COVID-19, MRNA, LNP-S, PF, 100MCG/0.5ML DOSE(MODERNA): CPT
